# Patient Record
Sex: FEMALE | Race: WHITE | Employment: FULL TIME | URBAN - METROPOLITAN AREA
[De-identification: names, ages, dates, MRNs, and addresses within clinical notes are randomized per-mention and may not be internally consistent; named-entity substitution may affect disease eponyms.]

---

## 2017-10-04 ENCOUNTER — HOSPITAL ENCOUNTER (OUTPATIENT)
Dept: MAMMOGRAPHY | Facility: CLINIC | Age: 52
Discharge: HOME | End: 2017-10-04
Attending: FAMILY MEDICINE | Admitting: FAMILY MEDICINE
Payer: COMMERCIAL

## 2017-10-04 DIAGNOSIS — Z12.31 VISIT FOR SCREENING MAMMOGRAM: ICD-10-CM

## 2017-10-04 PROCEDURE — G0202 SCR MAMMO BI INCL CAD: HCPCS

## 2018-10-17 ENCOUNTER — TELEPHONE (OUTPATIENT)
Dept: FAMILY MEDICINE | Facility: CLINIC | Age: 53
End: 2018-10-17

## 2018-10-17 ENCOUNTER — HOSPITAL ENCOUNTER (OUTPATIENT)
Dept: MAMMOGRAPHY | Facility: CLINIC | Age: 53
Discharge: HOME | End: 2018-10-17
Attending: FAMILY MEDICINE | Admitting: FAMILY MEDICINE
Payer: COMMERCIAL

## 2018-10-17 DIAGNOSIS — Z12.31 VISIT FOR SCREENING MAMMOGRAM: ICD-10-CM

## 2018-10-17 PROCEDURE — 77067 SCR MAMMO BI INCL CAD: CPT

## 2018-10-17 NOTE — TELEPHONE ENCOUNTER
Patient can be seen to discuss cyst and mammogram results on 10/24 at 4:00pm. Please contact patient to advise and confirm. Appointment made to hold time.  Lily Cheney, CMA

## 2018-10-17 NOTE — TELEPHONE ENCOUNTER
Reason for Call:  Same Day Appointment, Requested Provider:  Kari Moya M.D.    PCP: Kari Moya    Reason for visit: discuss mammogram    Duration of symptoms: 3 yrs     Have you been treated for this in the past? Yes    Additional comments: cyst under right nipple, was going with cycle, cycle has changed.  Use to go from small to large now stays large     Can we leave a detailed message on this number? YES    Phone number patient can be reached at: Home number on file [phone] (home)    Best Time: anytime     Call taken on 10/17/2018 at 9:51 AM by Paty Clifton

## 2018-10-24 ENCOUNTER — OFFICE VISIT (OUTPATIENT)
Dept: FAMILY MEDICINE | Facility: CLINIC | Age: 53
End: 2018-10-24
Payer: COMMERCIAL

## 2018-10-24 VITALS
SYSTOLIC BLOOD PRESSURE: 132 MMHG | HEART RATE: 106 BPM | BODY MASS INDEX: 30.15 KG/M2 | OXYGEN SATURATION: 96 % | TEMPERATURE: 97.9 F | DIASTOLIC BLOOD PRESSURE: 80 MMHG | HEIGHT: 64 IN | WEIGHT: 176.6 LBS

## 2018-10-24 DIAGNOSIS — N63.0 LUMP OR MASS IN BREAST: Primary | ICD-10-CM

## 2018-10-24 DIAGNOSIS — N95.1 PERIMENOPAUSAL: ICD-10-CM

## 2018-10-24 PROCEDURE — 99213 OFFICE O/P EST LOW 20 MIN: CPT | Performed by: FAMILY MEDICINE

## 2018-10-24 RX ORDER — NAPROXEN SODIUM 220 MG
220 TABLET ORAL 2 TIMES DAILY WITH MEALS
COMMUNITY
End: 2020-10-07

## 2018-10-24 ASSESSMENT — PAIN SCALES - GENERAL: PAINLEVEL: NO PAIN (0)

## 2018-10-24 NOTE — MR AVS SNAPSHOT
"              After Visit Summary   10/24/2018    Kristal Mehta    MRN: [id number]           Patient Information     Date Of Birth          1965        Visit Information        Provider Department      10/24/2018 4:00 PM Kari Moya MD [facility]         Follow-ups after your visit        Who to contact     If you have questions or need follow up information about today's clinic visit or your schedule please contact [facility] directly at [phone].  Normal or non-critical lab and imaging results will be communicated to you by MyChart, letter or phone within 4 business days after the clinic has received the results. If you do not hear from us within 7 days, please contact the clinic through MyChart or phone. If you have a critical or abnormal lab result, we will notify you by phone as soon as possible.  Submit refill requests through [email or web address] or call your pharmacy and they will forward the refill request to us. Please allow 3 business days for your refill to be completed.          Additional Information About Your Visit        Care EveryWhere ID     This is your Care EveryWhere ID. This could be used by other organizations to access your [facility] medical records  [id number]        Your Vitals Were     Pulse Temperature Height Last Period Pulse Oximetry Breastfeeding?    106 97.9  F (36.6  C) (Temporal) 5' 4\" (1.626 m) 10/18/2018 (Exact Date) 96% No    BMI (Body Mass Index)                   30.31 kg/m2            Blood Pressure from Last 3 Encounters:   10/24/18 132/80   12/14/16 122/84   11/09/16 102/70    Weight from Last 3 Encounters:   10/24/18 176 lb 9.6 oz (80.1 kg)   12/14/16 176 lb 8 oz (80.1 kg)   11/09/16 170 lb 12 oz (77.5 kg)              Today, you had the following     No orders found for display       Primary Care Provider Office Phone # Fax #    Kari Moya [phone] [address] [place] " [address]        Equal Access to Services     [facility]: Hadii aad ku hadsiomnkristofer Renettaali, waloreleida luprashanthmishaha, qaunrulyta sharanharmanlevi herndon. So [facility] [phone].    ATENCIÓN: Si habla español, tiene a pedraza disposición servicios gratuitos de asistencia lingüística. Llame al [phone].    We comply with applicable federal civil rights laws and [place] laws. We do not discriminate on the basis of race, color, national origin, age, disability, sex, sexual orientation, or gender identity.            Thank you!     Thank you for choosing [facility]  for your care. Our goal is always to provide you with excellent care. Hearing back from our patients is one way we can continue to improve our services. Please take a few minutes to complete the written survey that you may receive in the mail after your visit with us. Thank you!             Your Updated Medication List - Protect others around you: Learn how to safely use, store and throw away your medicines at www.disposemymeds.org.          This list is accurate as of 10/24/18  5:22 PM.  Always use your most recent med list.                   Brand Name Dispense Instructions for use Diagnosis    ALEVE 220 MG tablet   Generic drug:  naproxen sodium      Take 220 mg by mouth 2 times daily (with meals)

## 2018-10-26 NOTE — PROGRESS NOTES
Visit Date:   10/24/2018      DATE OF VISIT:  10/24/2018.      SUBJECTIVE:  Kristal comes in today to follow up on a recent mammogram and ultrasound and to discuss breast cysts.  She has had some cysts noted on the past screening exams and has had followup ultrasounds and just is not sure where she stands on followup.  She is going through some menopausal changes as well as her periods.  See below for those notes.  She had a recent mammogram on 10/17/2018 that was normal.  This is a 3D mammogram and showed no abnormalities.  Prior to that, her last screening mammogram was also done as a 3D mammogram in 10/2017 and was also normal.  No suspicious abnormalities.  Prior to that, her recent followup was in 11/2016.  At that time it was an ultrasound on the right breast because of prior cysts.  She had a tiny residual cyst in the area of a prior biopsy.  She thinks she can still feel density on the inferior aspect of the right breast.  She does note that there is menstrual variation with this.  In the past it has come and gone with her periods, but now she thinks it seems to get bigger overall than it did in the past and it stays longer after her periods resolve.   Also her periods are changing now with perimenopause.  Her first period of 2018 was on 03/19, and she just had some very dark black blood.  Then again on 03/30, she had a very heavy episode where she went through 4 tampons in a day and then just a little bit of brown discharge and it ended suddenly.  Then she got another period a month later on 04/28 that lasted until 05/07 where it was light bleeding only on one side of the tampon.  When I asked her to describe this, she just said that the tampon was very lightly stained just on one side.  She thinks that relates to which side she is ovulating on.  She then had another menstrual cycle from 06/05 through 06/18.  It was very light but lasted longer than usual, and she had 2 nights of heavier bleeding during that  period.  Then she had nothing until 09/18, and it lasted until 09/25.  That was mostly spotting with wiping on the last 3 days.  She does have a sister with breast cancer at the age of 54.      Please see Epic for her past medical history which was briefly reviewed today.      OBJECTIVE:   VITAL SIGNS:  Noted.   GENERAL:  The patient is alert, oriented and in no acute distress.     BREASTS:  Visual inspection of the breasts reveals no visible abnormalities.  No asymmetry.  No skin changes or dimpling.  I examined her in the upright position with her hands at her sides, overhead and on her hips.  Lying down exam with palpation revealed a normal left breast.  On the right breast, there is a very slight density at the 8 o'clock position which I think is just fatty tissue and probably normal.  No discrete mass.  She feels the density at the 6 o'clock position on the right breast, and I do not feel any mass there.  She showed this to me right at the edge of the areola, but I did not appreciate a mass there.  Feels like normal fatty tissue.  No nipple discharge.  No axillary adenopathy.      ASSESSMENT:   1.  Followup breast cyst.   2.  Perimenopause.      PLAN:  I reassured her that her breast exam is normal today, and her last 2 mammograms were normal as well.  We will continue to follow these.  We discussed how breast changes can occur with menstrual changes and that she is going through perimenopause at this time.  We discussed perimenopause at length and these fluctuations in her period could last for a few more years, or they could stop at any time now.  I explained that if she has any bleeding after a year absence of bleeding, then she should have this followed up, but until then, there is a wide variety of normal bleeding period changes.  Will follow up with any concerns.  I also explained that bleeding on one side of the tampon is not really related to which side she ovulates on.  That is just an indicator of  light blood flow, not heavy enough to saturate the tampon.  I explained the anatomy and how the uterus comes together in the midline and blood comes out the center regardless of which side she ovulates on.  I offered her a flu vaccine today, but she declines.  We will continue to follow up with routine preventive care including mammograms yearly and other followup as needed.         URBANO GARCIA MD             D: 10/26/2018   T: 10/26/2018   MT: TIMA      Name:     KENNEY KIMBLE   MRN:      [id number]        Account:      [id number]   :      1965           Visit Date:   10/24/2018      Document: [id number]

## 2019-02-22 ENCOUNTER — TELEPHONE (OUTPATIENT)
Dept: FAMILY MEDICINE | Facility: CLINIC | Age: 54
End: 2019-02-22

## 2019-02-22 NOTE — TELEPHONE ENCOUNTER
Panel Management Review      Patient has the following on her problem list: None      Composite cancer screening  Chart review shows that this patient is due/due soon for the following Colonoscopy  Summary:    Patient is due/failing the following:   COLONOSCOPY and PHYSICAL    Action needed:   Patient needs office visit for physical. and Patient needs referral/order: colonoscopy    Type of outreach:    unable to reach pt at home number, no voicemail box set up. Please attempt to contact later.    Questions for provider review:    None                                                                                                                                    Lily Cheney CMA      Chart routed to Care Team .

## 2019-02-22 NOTE — LETTER
[address] [id number] [phone]        February 25, 2019    Kristal Mehta  [address]   [place] [address]        Dear Kristal,    We have been unable to reach you by phone. Please contact our office to update your contact information.    Our records indicate you are due for a colon cancer screening.    -Colon Cancer Screening- Recommended every 5-10 years, depending on your history, in order to prevent and detect colon cancer at its earliest stages.  Problem lesions can be removed before they ever become cancer.  This test is not only looking for cancer, but also getting rid of precancerious lesions.  You are usually given some sedation which makes the test very comfortable for most people.      If you do not wish to do a colonoscopy or cannot afford to do one, at this time, there is another option. It is called a FIT test or Fecal Immunochemical Occult Blood Test (take home stool sample kit).  It does not replace the colonoscopy for colorectal cancer screening, but it can detect hidden bleeding in the lower colon.  It does need to be repeated every year and if a positive result is obtained, you would be referred for a colonoscopy. The FIT test is really easy to do and does not require any  diet or medication restrictions and involves only one collection sample.      If you have completed either one of these tests or had a flexible sigmoidoscopy in the past five years at another facility, please have the records sent to our clinic so that we can best coordinate your care.  Please call us ([phone]) if you have questions or would like arrange either to do a colonoscopy or obtain the necessary test kit for the Fecal Occult Test.      Sincerely,        Kari Moya M.D.

## 2019-02-25 NOTE — TELEPHONE ENCOUNTER
Attempted to contact patient at home number with no answer and no voicemail box set up to leave a message. Letter mailed to patient.  Lily Cheney CMA

## 2019-03-11 NOTE — TELEPHONE ENCOUNTER
Patient calls to report she received letter.  She will have a her physical in October, and will schedule colonoscopy after that for the end of the year.

## 2019-03-20 ENCOUNTER — TELEPHONE (OUTPATIENT)
Dept: FAMILY MEDICINE | Facility: CLINIC | Age: 54
End: 2019-03-20

## 2019-03-20 NOTE — TELEPHONE ENCOUNTER
Reason for Call:  Same Day Appointment, Requested Provider:  Kari Moya M.D.    PCP: Kari Moya    Reason for visit: she was putting on deoderant today and noticed three bumps in her left armpit.  Would like to be seen as soon as possible for this.    Duration of symptoms:     Have you been treated for this in the past?     Additional comments:     Can we leave a detailed message on this number? YES    Phone number patient can be reached at: Home number on file [phone] (home)    Best Time: any    Call taken on 3/20/2019 at 3:28 PM by Camacho Russell;

## 2019-03-21 NOTE — TELEPHONE ENCOUNTER
Patient can be worked in 3/22 at 8:00am to check lumps in armpit. Appointment made to hold time.   Patient advised and states she can make this time work.  Lily Cheney, CMA

## 2019-03-22 ENCOUNTER — OFFICE VISIT (OUTPATIENT)
Dept: FAMILY MEDICINE | Facility: CLINIC | Age: 54
End: 2019-03-22
Payer: COMMERCIAL

## 2019-03-22 ENCOUNTER — TELEPHONE (OUTPATIENT)
Dept: FAMILY MEDICINE | Facility: CLINIC | Age: 54
End: 2019-03-22

## 2019-03-22 VITALS
RESPIRATION RATE: 12 BRPM | TEMPERATURE: 97.6 F | OXYGEN SATURATION: 97 % | WEIGHT: 172.4 LBS | SYSTOLIC BLOOD PRESSURE: 118 MMHG | HEART RATE: 100 BPM | DIASTOLIC BLOOD PRESSURE: 66 MMHG | BODY MASS INDEX: 29.59 KG/M2

## 2019-03-22 DIAGNOSIS — R73.01 ELEVATED FASTING GLUCOSE: ICD-10-CM

## 2019-03-22 DIAGNOSIS — R22.32 MASS OF LEFT AXILLA: Primary | ICD-10-CM

## 2019-03-22 DIAGNOSIS — E78.1 HYPERTRIGLYCERIDEMIA: ICD-10-CM

## 2019-03-22 DIAGNOSIS — Z13.6 CARDIOVASCULAR SCREENING; LDL GOAL LESS THAN 160: ICD-10-CM

## 2019-03-22 LAB
BASOPHILS # BLD AUTO: 0.1 10E9/L (ref 0–0.2)
BASOPHILS NFR BLD AUTO: 0.9 %
CHOLEST SERPL-MCNC: 239 MG/DL
DIFFERENTIAL METHOD BLD: ABNORMAL
EOSINOPHIL NFR BLD AUTO: 1.1 %
ERYTHROCYTE [DISTWIDTH] IN BLOOD BY AUTOMATED COUNT: 11.9 % (ref 10–15)
GLUCOSE SERPL-MCNC: 103 MG/DL (ref 70–99)
HBA1C MFR BLD: 5.6 % (ref 0–5.6)
HCT VFR BLD AUTO: 48.7 % (ref 35–47)
HDLC SERPL-MCNC: 41 MG/DL
HGB BLD-MCNC: 16.2 G/DL (ref 11.7–15.7)
IMM GRANULOCYTES # BLD: 0 10E9/L (ref 0–0.4)
IMM GRANULOCYTES NFR BLD: 0.3 %
LDLC SERPL CALC-MCNC: 138 MG/DL
LYMPHOCYTES # BLD AUTO: 1.7 10E9/L (ref 0.8–5.3)
LYMPHOCYTES NFR BLD AUTO: 25.9 %
MCH RBC QN AUTO: 31.3 PG (ref 26.5–33)
MCHC RBC AUTO-ENTMCNC: 33.3 G/DL (ref 31.5–36.5)
MCV RBC AUTO: 94 FL (ref 78–100)
MONOCYTES # BLD AUTO: 0.4 10E9/L (ref 0–1.3)
MONOCYTES NFR BLD AUTO: 6.5 %
NEUTROPHILS # BLD AUTO: 4.2 10E9/L (ref 1.6–8.3)
NEUTROPHILS NFR BLD AUTO: 65.3 %
NONHDLC SERPL-MCNC: 198 MG/DL
NRBC # BLD AUTO: 0 10*3/UL
NRBC BLD AUTO-RTO: 0 /100
PLATELET # BLD AUTO: 321 10E9/L (ref 150–450)
RBC # BLD AUTO: 5.18 10E12/L (ref 3.8–5.2)
TRIGL SERPL-MCNC: 298 MG/DL
WBC # BLD AUTO: 6.5 10E9/L (ref 4–11)

## 2019-03-22 PROCEDURE — 85025 COMPLETE CBC W/AUTO DIFF WBC: CPT | Performed by: FAMILY MEDICINE

## 2019-03-22 PROCEDURE — 80061 LIPID PANEL: CPT | Performed by: FAMILY MEDICINE

## 2019-03-22 PROCEDURE — 83036 HEMOGLOBIN GLYCOSYLATED A1C: CPT | Performed by: FAMILY MEDICINE

## 2019-03-22 PROCEDURE — 99214 OFFICE O/P EST MOD 30 MIN: CPT | Performed by: FAMILY MEDICINE

## 2019-03-22 PROCEDURE — 82947 ASSAY GLUCOSE BLOOD QUANT: CPT | Performed by: FAMILY MEDICINE

## 2019-03-22 PROCEDURE — 36415 COLL VENOUS BLD VENIPUNCTURE: CPT | Performed by: FAMILY MEDICINE

## 2019-03-22 ASSESSMENT — PAIN SCALES - GENERAL: PAINLEVEL: NO PAIN (0)

## 2019-03-22 NOTE — RESULT ENCOUNTER NOTE
Notify Nettie that her cholesterol is a little elevated again.  Her triglycerides are more elevated and her sugar is elevated this year as well.  I added a diabetes test and will notify her when that is done.  The good news is that her blood count is completely normal.  I have no concern at this time for cancer or infection in those lumps in the armpit.  Ok to send copy of labs.   Kari Moya MD

## 2019-03-22 NOTE — TELEPHONE ENCOUNTER
Attempted to contact patient to question what day and time would work for completing her US. No answer and no mail box set up to leave a voicemail message. Therese Velasco LPN

## 2019-03-22 NOTE — PROGRESS NOTES
S:  Kristal Mehta is a 53 year old female who complains of lumps in the armpit.  Message from 2 days ago states that she was putting on deodorant and noticed three bumps in her left armpit.      She thinks they might be a little smaller since then.  She is worried about developing cancer there.  She has had a breast lump biopsied in 2016 but it was on the right side.  It was benign.  Her recent mammogram in October was normal.  Her mammogram the year prior was normal.      Also she is fasting today and wondering if she can get her cholesterol drawn.  She has had mildly elevated cholesterol in the past.  She is otherwise well.    7 pt ROS is otherwise negative except as noted in HPI.      O:  /66   Pulse 100   Temp 97.6  F (36.4  C) (Temporal)   Resp 12   Wt 78.2 kg (172 lb 6.4 oz)   LMP 01/29/2019 (Exact Date)   SpO2 97%   Breastfeeding? No   BMI 29.59 kg/m     Neck:  Supple without lymphadenopathy, JVD or masses.   Breasts: Visual inspection of the breasts is normal without skin changes.  Palpation reveals no obvious masses or nipple discharge.  She has a small subcentimeter density just below the right nipple that feels fatty to me.  She states this lump is always been there.  It feels normal.  No right axillary masses.  In the left axilla when she extends her arm all the way up I can visualize 3 tiny sub-cutaneous lumps arranged in a triangle.  They are approximately 4-5 mm each.  No overlying skin changes.  No definite pimples or breaks in the skin.  Palpation of them is very subtle.  They feel fatty.  Not firm or mobile.  No surrounding induration or erythema.    Orders Placed This Encounter   Procedures     US Axillary Left     Glucose     Lipid panel reflex to direct LDL Fasting     CBC with platelets and differential     Hemoglobin A1c        A:      ICD-10-CM    1. Mass of left axilla R22.32 CBC with platelets and differential     US Axillary Left   2. CARDIOVASCULAR SCREENING; LDL GOAL LESS  THAN 160 Z13.6    3. Hypertriglyceridemia E78.1 Glucose     Lipid panel reflex to direct LDL Fasting   4. Elevated fasting glucose R73.01 Hemoglobin A1c       P: Overall my concern for these lumps being anything abnormal is small.  However because of her concern I am going to set her up with an ultrasound of the axilla and will do a CBC today to check for elevated white blood count.  This could indicate blood cell malignancy such as lymphoma or infection.  Also because she is fasting we will do screening labs including a lipid panel and a glucose.    Will notify her with results and she will monitor for changes in her lumps.  Encouraged ongoing monthly self breast exams.    Kari Moya MD

## 2019-03-25 ENCOUNTER — TELEPHONE (OUTPATIENT)
Dept: FAMILY MEDICINE | Facility: CLINIC | Age: 54
End: 2019-03-25

## 2019-03-25 ENCOUNTER — HOSPITAL ENCOUNTER (OUTPATIENT)
Dept: ULTRASOUND IMAGING | Facility: CLINIC | Age: 54
Discharge: HOME | End: 2019-03-25
Attending: FAMILY MEDICINE | Admitting: FAMILY MEDICINE
Payer: COMMERCIAL

## 2019-03-25 DIAGNOSIS — R22.32 MASS OF LEFT AXILLA: ICD-10-CM

## 2019-03-25 PROCEDURE — 76882 US LMTD JT/FCL EVL NVASC XTR: CPT | Mod: LT

## 2019-03-25 NOTE — TELEPHONE ENCOUNTER
Reason for Call:  Request for results:    Name of test or procedure: labs    Date of test of procedure: 03/22/19    Location of the test or procedure: [facility] to leave the result message on voice mail or with a family member? YES    Phone number Patient can be reached at:  [phone]    Additional comments: Kristal would like you to call her before 2 pm today with her lab results    Call taken on 3/25/2019 at 8:22 AM by Awa Barajas

## 2019-03-25 NOTE — TELEPHONE ENCOUNTER
Patient called back, relayed message above. Transferred to radiology to schedule appt.        Awa Etienne ~ Patient Representative  [address] [address]  [email or web address]  [email or web address]  Office:  [phone]  Fax:  [phone]

## 2019-03-26 NOTE — TELEPHONE ENCOUNTER
Per Kari Moya MD patient's A1c lab is upper end of the normal limit. This means patient is becoming pre-diabetic but not diabetic just yet. She recommends patient continue to work on weight loss and following a low carb diet. Patient should also work on exercise.   Called patient at home number with no answer and no voicemail set up. Please contact patient later to advise on MD notes.  Lily Cheney CMA

## 2019-03-26 NOTE — TELEPHONE ENCOUNTER
Patient is calling back and information was given. She is asking for a list of things to avoid and what to change. Please mail to her home address.  Thank you,  Leslie Lehman   for [facility]

## 2019-03-27 ENCOUNTER — TELEPHONE (OUTPATIENT)
Dept: FAMILY MEDICINE | Facility: CLINIC | Age: 54
End: 2019-03-27

## 2019-03-27 NOTE — TELEPHONE ENCOUNTER
Attempted to call pt again, no answer unable to leave msg. Will try again later. Edilma Zurita, CMA

## 2019-03-27 NOTE — RESULT ENCOUNTER NOTE
Notify Kristal that her ultrasound shows nothing abnormal in the underarm. I think these bumps are just skin irritation, and as long as they are not getting bigger we can ignore them.  Kari Moya MD

## 2019-03-27 NOTE — TELEPHONE ENCOUNTER
----- Message from Kari Moya MD sent at 3/27/2019  9:39 AM CDT -----  Notify Kristal that her ultrasound shows nothing abnormal in the underarm. I think these bumps are just skin irritation, and as long as they are not getting bigger we can ignore them.  Kari Moya MD

## 2019-03-27 NOTE — TELEPHONE ENCOUNTER
Called patient to inform of ultrasound results and MD recommendation. Patient did not answer and voicemail box is full so no message can be left. Please attempt to contact her again later.  Lily Cheney CMA

## 2019-05-29 ENCOUNTER — TELEPHONE (OUTPATIENT)
Dept: FAMILY MEDICINE | Facility: CLINIC | Age: 54
End: 2019-05-29

## 2019-05-29 DIAGNOSIS — R73.09 ELEVATED GLUCOSE: Primary | ICD-10-CM

## 2019-05-29 NOTE — TELEPHONE ENCOUNTER
Is patient able to complete A1C and glucose or should she wait a full 3 months? Last A1c was 3/22/19. Edilma Zurita, CMA

## 2019-05-29 NOTE — TELEPHONE ENCOUNTER
Reason for Call: Request for an order or referral:    Order or referral being requested: DM labs     Date needed: at your convenience    Has the patient been seen by the PCP for this problem? YES    Additional comments: Pt was told 2 months ago that she is pre-diabetic. She has lost 21 pounds since then and would like to have those labs rechecked.     Phone number Patient can be reached at:  Home number on file [phone] (home)    Best Time:  Any     Can we leave a detailed message on this number?  YES    Call taken on 5/29/2019 at 8:08 AM by Yissel Harding

## 2019-06-03 NOTE — TELEPHONE ENCOUNTER
Called patient and was unable to leave a message due to the inbox being full.  If patient calls back please relay this message per Dr. Moya.  Her a1c was barely elevated and she wouldn't be able to check that for 1 more year (3/2020) we can recheck her glucose if she would like.  Dr. Moya wanted to tell her good job with the loosing weight and to keep up the good work.    Nicole Calero, CMA

## 2019-06-04 NOTE — TELEPHONE ENCOUNTER
Tried calling pt and no answer and unable to leave a voice mail. Will try back later.  Skylar Rodriguez MA

## 2019-06-04 NOTE — TELEPHONE ENCOUNTER
Called pt and informed her of the below msg. She does want to recheck her glucose. I put an order in her chart and made her a lab appt for tomorrow.  Skylar Rodriguez MA

## 2019-06-05 DIAGNOSIS — R73.09 ELEVATED GLUCOSE: ICD-10-CM

## 2019-06-05 LAB — GLUCOSE SERPL-MCNC: 92 MG/DL (ref 70–99)

## 2019-06-05 PROCEDURE — 82947 ASSAY GLUCOSE BLOOD QUANT: CPT | Performed by: FAMILY MEDICINE

## 2019-06-05 PROCEDURE — 36415 COLL VENOUS BLD VENIPUNCTURE: CPT | Performed by: FAMILY MEDICINE

## 2019-06-07 ENCOUNTER — TELEPHONE (OUTPATIENT)
Dept: FAMILY MEDICINE | Facility: CLINIC | Age: 54
End: 2019-06-07

## 2019-06-07 NOTE — TELEPHONE ENCOUNTER
LM with  to have patient call the clinic with results below. Please relay message to patient when she calls. Nicole Mason CMA ([facility])

## 2019-06-07 NOTE — TELEPHONE ENCOUNTER
----- Message from Kari Moya MD sent at 6/7/2019  8:27 AM CDT -----  Notify Kristal that her blood sugar is normal.   Kari Moya MD

## 2019-10-16 ENCOUNTER — HOSPITAL ENCOUNTER (OUTPATIENT)
Dept: MAMMOGRAPHY | Facility: CLINIC | Age: 54
Discharge: HOME | End: 2019-10-16
Attending: FAMILY MEDICINE | Admitting: FAMILY MEDICINE
Payer: COMMERCIAL

## 2019-10-16 ENCOUNTER — OFFICE VISIT (OUTPATIENT)
Dept: FAMILY MEDICINE | Facility: CLINIC | Age: 54
End: 2019-10-16
Payer: COMMERCIAL

## 2019-10-16 ENCOUNTER — TELEPHONE (OUTPATIENT)
Dept: FAMILY MEDICINE | Facility: CLINIC | Age: 54
End: 2019-10-16

## 2019-10-16 VITALS
BODY MASS INDEX: 22.09 KG/M2 | DIASTOLIC BLOOD PRESSURE: 64 MMHG | WEIGHT: 132.6 LBS | SYSTOLIC BLOOD PRESSURE: 122 MMHG | OXYGEN SATURATION: 100 % | HEART RATE: 83 BPM | RESPIRATION RATE: 14 BRPM | HEIGHT: 65 IN | TEMPERATURE: 98 F

## 2019-10-16 DIAGNOSIS — E78.5 HYPERLIPIDEMIA LDL GOAL <160: ICD-10-CM

## 2019-10-16 DIAGNOSIS — E78.1 HYPERTRIGLYCERIDEMIA: ICD-10-CM

## 2019-10-16 DIAGNOSIS — Z12.11 SPECIAL SCREENING FOR MALIGNANT NEOPLASMS, COLON: ICD-10-CM

## 2019-10-16 DIAGNOSIS — R73.03 PREDIABETES: ICD-10-CM

## 2019-10-16 DIAGNOSIS — Z13.820 SCREENING FOR OSTEOPOROSIS: ICD-10-CM

## 2019-10-16 DIAGNOSIS — Z12.4 SCREENING FOR MALIGNANT NEOPLASM OF CERVIX: ICD-10-CM

## 2019-10-16 DIAGNOSIS — Z12.31 VISIT FOR SCREENING MAMMOGRAM: ICD-10-CM

## 2019-10-16 DIAGNOSIS — Z23 NEED FOR VACCINATION: ICD-10-CM

## 2019-10-16 DIAGNOSIS — Z00.00 ROUTINE GENERAL MEDICAL EXAMINATION AT A HEALTH CARE FACILITY: Primary | ICD-10-CM

## 2019-10-16 LAB
CHOLEST SERPL-MCNC: 214 MG/DL
GLUCOSE SERPL-MCNC: 85 MG/DL (ref 70–99)
HBA1C MFR BLD: 5.4 % (ref 0–5.6)
HDLC SERPL-MCNC: 51 MG/DL
LDLC SERPL CALC-MCNC: 135 MG/DL
NONHDLC SERPL-MCNC: 163 MG/DL
TRIGL SERPL-MCNC: 141 MG/DL

## 2019-10-16 PROCEDURE — 87624 HPV HI-RISK TYP POOLED RSLT: CPT | Performed by: FAMILY MEDICINE

## 2019-10-16 PROCEDURE — 36415 COLL VENOUS BLD VENIPUNCTURE: CPT | Performed by: FAMILY MEDICINE

## 2019-10-16 PROCEDURE — 90471 IMMUNIZATION ADMIN: CPT | Performed by: FAMILY MEDICINE

## 2019-10-16 PROCEDURE — G0145 SCR C/V CYTO,THINLAYER,RESCR: HCPCS | Performed by: FAMILY MEDICINE

## 2019-10-16 PROCEDURE — 82947 ASSAY GLUCOSE BLOOD QUANT: CPT | Performed by: FAMILY MEDICINE

## 2019-10-16 PROCEDURE — 90715 TDAP VACCINE 7 YRS/> IM: CPT | Performed by: FAMILY MEDICINE

## 2019-10-16 PROCEDURE — 80061 LIPID PANEL: CPT | Performed by: FAMILY MEDICINE

## 2019-10-16 PROCEDURE — 83036 HEMOGLOBIN GLYCOSYLATED A1C: CPT | Performed by: FAMILY MEDICINE

## 2019-10-16 PROCEDURE — 99396 PREV VISIT EST AGE 40-64: CPT | Mod: 25 | Performed by: FAMILY MEDICINE

## 2019-10-16 PROCEDURE — 77063 BREAST TOMOSYNTHESIS BI: CPT

## 2019-10-16 ASSESSMENT — MIFFLIN-ST. JEOR: SCORE: 1217.96

## 2019-10-16 NOTE — TELEPHONE ENCOUNTER
Date of colonoscopy/EGD: 11/13  Surgeon: Dr. Forrester  Prep:Miralax  Packet:Colonoscopy/EGD instructions mailed to patient's home address.   Date: 10/16/2019      Surgery Scheduler

## 2019-10-16 NOTE — LETTER
October 24, 2019    Kristal Mehta  [address] [place] [address]    Dear ,  This letter is regarding your recent Pap smear (cervical cancer screening) and Human Papillomavirus (HPV) test.  We are happy to inform you that your Pap smear result is normal. Cervical cancer is closely linked with certain types of HPV. Your results showed no evidence of high-risk HPV.  We recommend you have your next PAP smear and HPV test in 5 years.  You will still need to return to the clinic every year for an annual exam and other preventive tests.  If you have additional questions regarding this result, please call our registered nurse, Yissel at [phone].  Sincerely,    Kari Moya MD/floresita

## 2019-10-16 NOTE — TELEPHONE ENCOUNTER
Left message for patient to return call to schedule colonoscopy or EGD. If Celeste or Alize are unavailable, please transfer to the surgery center.

## 2019-10-16 NOTE — PATIENT INSTRUCTIONS
I will notify you with lab results from today.    I recommend you get a Bone Density Scan within the next year. Please schedule this at your convenience.     I ordered a Colonoscopy. They will call you to schedule this at your convenience.     You will be notified with your mammogram results.     Consider a low dose screening CT scan for lung cancer.  You are eligible for this due to your age and smoking history. Notify me if you'd like to have this done.     Preventive Health Recommendations  Female Ages 50 - 64    Yearly exam: See your health care provider every year in order to  o Review health changes.   o Discuss preventive care.    o Review your medicines if your doctor has prescribed any.      Get a Pap test every three years (unless you have an abnormal result and your provider advises testing more often).    If you get Pap tests with HPV test, you only need to test every 5 years, unless you have an abnormal result.     You do not need a Pap test if your uterus was removed (hysterectomy) and you have not had cancer.    You should be tested each year for STDs (sexually transmitted diseases) if you're at risk.     Have a mammogram every 1 to 2 years.    Have a colonoscopy at age 50, or have a yearly FIT test (stool test). These exams screen for colon cancer.      Have a cholesterol test every 5 years, or more often if advised.    Have a diabetes test (fasting glucose) every three years. If you are at risk for diabetes, you should have this test more often.     If you are at risk for osteoporosis (brittle bone disease), think about having a bone density scan (DEXA).    Shots: Get a flu shot each year. Get a tetanus shot every 10 years.    Nutrition:     Eat at least 5 servings of fruits and vegetables each day.    Eat whole-grain bread, whole-wheat pasta and brown rice instead of white grains and rice.    Get adequate Calcium and Vitamin D.     Lifestyle    Exercise at least 150 minutes a week (30 minutes a  day, 5 days a week). This will help you control your weight and prevent disease.    Limit alcohol to one drink per day.    No smoking.     Wear sunscreen to prevent skin cancer.     See your dentist every six months for an exam and cleaning.    See your eye doctor every 1 to 2 years.

## 2019-10-16 NOTE — PROGRESS NOTES
SUBJECTIVE:   CC: Kristal Mehta is an 53 year old woman who presents for preventive health visit.     Healthy Habits:     Getting at least 3 servings of Calcium per day:  Yes    Bi-annual eye exam:  Yes    Dental care twice a year:  NO    Sleep apnea or symptoms of sleep apnea:  None    Diet:  Diabetic    Frequency of exercise:  6-7 days/week    Duration of exercise:  45-60 minutes    Barriers to taking medications:  Not applicable    Medication side effects:  Not applicable    PHQ-2 Total Score: 0    Additional concerns today:  No      Hyperlipidemia Follow-Up    She has a history of Hyperlipidemia, currently not treated with any medication.       Are you having any of the following symptoms? (Select all that apply)  No complaints of shortness of breath, chest pain or pressure.  No increased sweating or nausea with activity.  No left-sided neck or arm pain.  No complaints of pain in calves when walking 1-2 blocks.    Are you regularly taking any medication or supplement to lower your cholesterol?   No    Are you having muscle aches or other side effects that you think could be caused by your cholesterol lowering medication?  N/A      CONCERNS: She doesn't have any concerns at this visit.     See ROS.       Today's PHQ-2 Score:   PHQ-2 ( 1999 Pfizer) 10/16/2019   Q1: Little interest or pleasure in doing things 0   Q2: Feeling down, depressed or hopeless 0   PHQ-2 Score 0       Abuse: Current or Past(Physical, Sexual or Emotional)- No  Do you feel safe in your environment? Yes    Social History     Tobacco Use     Smoking status: Current Every Day Smoker     Packs/day: 0.50     Years: 18.00     Pack years: 9.00     Smokeless tobacco: Never Used   Substance Use Topics     Alcohol use: Yes     Alcohol/week: 0.0 standard drinks     Comment: occasionally         Alcohol Use 11/9/2016   Prescreen: >3 drinks/day or >7 drinks/week? The patient does not drink >3 drinks per day nor >7 drinks per week.     Reviewed orders  with patient.  Reviewed health maintenance and updated orders accordingly - Yes    BP Readings from Last 3 Encounters:   10/16/19 122/64   03/22/19 118/66   10/24/18 132/80    Wt Readings from Last 3 Encounters:   10/16/19 60.1 kg (132 lb 9.6 oz)   03/22/19 78.2 kg (172 lb 6.4 oz)   10/24/18 80.1 kg (176 lb 9.6 oz)          Patient Active Problem List   Diagnosis     FAMILY HX CONDITION mo mult infarct dementia     Personal history of tobacco use, presenting hazards to health     Hyperlipidemia LDL goal <160     Uterine leiomyoma, unspecified location     Past Surgical History:   Procedure Laterality Date     HC EXC BENIGN SKIN LESION TRUNK/ARM/LEG >4 CM  10/01/08    excision lesion left thigh     HC EXCISION BREAST LESION, OPEN >=1  12/13/06    Left breast     HC REPAIR INTERMED, WOUND TRUNK/ARM/LEG 2.6-7.5 CM       OPEN REDUCTION INTERNAL FIXATION WRIST  12/6/2011    right wrist fracture repair       Social History     Tobacco Use     Smoking status: Current Every Day Smoker     Packs/day: 0.50     Years: 18.00     Pack years: 9.00     Smokeless tobacco: Never Used   Substance Use Topics     Alcohol use: Yes     Alcohol/week: 0.0 standard drinks     Comment: occasionally     Family History   Problem Relation Age of Onset     Allergies Mother         animal and trees     Genitourinary Problems Mother         bladder tumor non cancerous     Lipids Mother         cholesterol     Alzheimer Disease Maternal Grandfather      Other Cancer Sister      Alcohol/Drug No family hx of      Anesthesia Reaction No family hx of      Arthritis No family hx of      Blood Disease No family hx of      Cancer No family hx of      Circulatory No family hx of      Connective Tissue Disorder No family hx of      Depression No family hx of      Diabetes No family hx of      Genetic Disorder No family hx of      Gastrointestinal Disease No family hx of      Gynecology No family hx of      Heart Disease No family hx of      Hypertension No  family hx of      Neurologic Disorder No family hx of      Osteoporosis No family hx of      Psychotic Disorder No family hx of      Respiratory No family hx of      Cerebrovascular Disease No family hx of      Thyroid Disease No family hx of      Obesity No family hx of          Current Outpatient Medications   Medication Sig Dispense Refill     Flaxseed, Linseed, (FLAXSEED OIL PO)        Multiple Vitamins-Minerals (HAIR SKIN AND NAILS FORMULA PO)        naproxen sodium (ALEVE) 220 MG tablet Take 220 mg by mouth 2 times daily (with meals)       Allergies   Allergen Reactions     No Known Drug Allergies        Mammogram Screening: Patient over age 50, mutual decision to screen reflected in health maintenance.    Pertinent mammograms are reviewed under the imaging tab.  History of abnormal Pap smear:   YES - updated in Problem List and Health Maintenance accordingly    Last 3 Pap Results:   PAP (no units)   Date Value   11/09/2016 NIL   11/10/2010 ASC-US (A)   12/26/2006 NIL     PAP / HPV Latest Ref Rng & Units 11/9/2016 11/10/2010 12/26/2006   PAP - NIL ASC-US(A) NIL   HPV 16 DNA NEG Negative - -   HPV 18 DNA NEG Negative - -   OTHER HR HPV NEG Negative - -     Reviewed and updated as needed this visit by clinical staff  Tobacco  Allergies  Meds  Med Hx  Surg Hx  Fam Hx  Soc Hx        Reviewed and updated as needed this visit by Provider  Tobacco  Allergies  Meds  Problems  Med Hx  Surg Hx  Fam Hx        Past Medical History:   Diagnosis Date     ASCUS of cervix with negative high risk HPV 8/28/2011    11/10/10- ASCUS neg HPV. Plan-- repeat screening pap in one year (due 11/2011) 11/8/16 NIL pap/neg HR HPV. Plan:         Past Surgical History:   Procedure Laterality Date     HC EXC BENIGN SKIN LESION TRUNK/ARM/LEG >4 CM  10/01/08    excision lesion left thigh     HC EXCISION BREAST LESION, OPEN >=1  12/13/06    Left breast     HC REPAIR INTERMED, WOUND TRUNK/ARM/LEG 2.6-7.5 CM       OPEN REDUCTION  "INTERNAL FIXATION WRIST  12/6/2011    right wrist fracture repair       Review of Systems  CONSTITUTIONAL: Positive for intentional weight loss. She has lost 40 lbs since 3/22/2019, when she was diagnosed with Pre-Diabetes. She decreased her carb intake, fat intake, and controls her portion sizes. She also exercises using a \"Simple Fit\" board and exercising daily. NEGATIVE for fever, chills  INTEGUMENTARU/SKIN: NEGATIVE for worrisome rashes, moles or lesions  EYES: NEGATIVE for vision changes or irritation. She wears corrective lenses. She follows up with her eye doctor regularly.   ENT: NEGATIVE for ear, mouth and throat problems  RESP: NEGATIVE for significant cough or SOB  BREAST: NEGATIVE for masses, tenderness or discharge  CV: NEGATIVE for chest pain, palpitations or peripheral edema  GI: NEGATIVE for nausea, abdominal pain, heartburn, or change in bowel habits  : NEGATIVE for unusual urinary or vaginal symptoms. Last LMP was 1/29/2019. PMHx of uterine fibroids.   MUSCULOSKELETAL: NEGATIVE for significant arthralgias or myalgia  NEURO: NEGATIVE for weakness, dizziness or paresthesias  PSYCHIATRIC: NEGATIVE for changes in mood or affect     OBJECTIVE:   /64   Pulse 83   Temp 98  F (36.7  C) (Temporal)   Resp 14   Ht 1.66 m (5' 5.35\")   Wt 60.1 kg (132 lb 9.6 oz)   LMP 01/29/2019 (Exact Date)   SpO2 100%   BMI 21.83 kg/m    Physical Exam  GENERAL: healthy, alert and no distress  EYES: Eyes grossly normal to inspection, PERRL and conjunctivae and sclerae normal  HENT: ear canals normal. Slight clear fluid behind bilateral TMs. TMs are otherwise normal. nose and mouth without ulcers or lesions  NECK: no adenopathy, no asymmetry, masses, or scars and thyroid normal to palpation, no bruits.   RESP: lungs clear to auscultation - no rales, rhonchi or wheezes  BREAST: normal without masses, tenderness or nipple discharge and no palpable axillary masses or adenopathy  CV: regular rate and rhythm, normal " S1 S2, no S3 or S4, no murmur, click or rub, no peripheral edema and peripheral pulses strong  ABDOMEN: soft, nontender, no hepatosplenomegaly, no masses and bowel sounds normal  PELVIC: Vaginal exam reveals normal external and internal genitalia.  Cervix is closed, long and thick.  No lesions or abnormalities seen. PAP obtained without incident.  Bimanual exam reveals a nontender, nongravid uterus with no CMT.  No adnexal masses or tenderness.    MS: no gross musculoskeletal defects noted, no edema  SKIN: no suspicious lesions or rashes  NEURO: Normal strength and tone, mentation intact and speech normal  PSYCH: mentation appears normal, affect normal/bright    Diagnostic Test Results:  Orders Placed This Encounter   Procedures     DX Hip/Pelvis/Spine     Pap imaged thin layer screen with HPV - recommended age 30 - 65 years (select HPV order below)     HPV High Risk Types DNA Cervical     Lipid panel reflex to direct LDL Fasting     Glucose     Hemoglobin A1c     GASTROENTEROLOGY ADULT REF PROCEDURE ONLY None       ASSESSMENT/PLAN:       ICD-10-CM    1. Routine general medical examination at a health care facility Z00.00    2. Prediabetes R73.03 Glucose     Hemoglobin A1c   3. Hypertriglyceridemia E78.1 Lipid panel reflex to direct LDL Fasting   4. Special screening for malignant neoplasms, colon Z12.11 GASTROENTEROLOGY ADULT REF PROCEDURE ONLY None   5. Screening for malignant neoplasm of cervix Z12.4 Pap imaged thin layer screen with HPV - recommended age 30 - 65 years (select HPV order below)     HPV High Risk Types DNA Cervical   6. Screening for osteoporosis Z13.820 DX Hip/Pelvis/Spine   7. Need for vaccination Z23 TDAP VACCINE (ADACEL) [28101.002]   8. Hyperlipidemia LDL goal <160 E78.5      - Her last PAP was NIL on 11/9/2016. PAP collected today, will notify with results.   - Labs collected as above, will notify with results and discuss further evaluation/ treatment if needed at that time.   - No refills  "needed.   - Mammogram completed today, will notify with results.   - Colonoscopy ordered, she will schedule this at her convenience.   - Her bone density scan was normal in 7/2013. Bone Density Scan ordered, will notify with results.   - Discussed lung cancer CT for smokers. She will notify me if she'd like to have this done.   - TDAP given today. She declines the Influenza vaccination. Reviewed, vaccinations are otherwise UTD.   - Follow up for routine physical in 1 year, or sooner if needed.      COUNSELING:  Reviewed preventive health counseling, as reflected in patient instructions       Regular exercise       Healthy diet/nutrition       Vision screening       Hearing screening       Osteoporosis Prevention/Bone Health       Colon cancer screening       (Esha)menopause management       Lung CT for lung cancer screen     Estimated body mass index is 21.83 kg/m  as calculated from the following:    Height as of this encounter: 1.66 m (5' 5.35\").    Weight as of this encounter: 60.1 kg (132 lb 9.6 oz).  - Discussed maintaining her weight loss without losing more.        reports that she has been smoking. She has a 9.00 pack-year smoking history. She has never used smokeless tobacco.  Tobacco Cessation Action Plan: Information offered: Patient not interested at this time   - Encouraged smoking cessation.     Counseling Resources:  ATP IV Guidelines  Pooled Cohorts Equation Calculator  Breast Cancer Risk Calculator  FRAX Risk Assessment  ICSI Preventive Guidelines  Dietary Guidelines for Americans, 2010  USDA's MyPlate  ASA Prophylaxis  Lung CA Screening    This document serves as a record of services personally performed by Kari Moya MD. It was created on their behalf by Nicole Moncada, a trained medical scribe. The creation of this record is based on the provider's personal observations and the statements of the patient. This document has been checked and approved by the attending provider.    Kari " Neeta Moya MD  [facility]

## 2019-10-16 NOTE — NURSING NOTE
Prior to immunization administration, verified patients identity using patient s name and date of birth. Please see Immunization Activity for additional information.     Screening Questionnaire for Adult Immunization    Are you sick today?   No   Do you have allergies to medications, food, a vaccine component or latex?   No   Have you ever had a serious reaction after receiving a vaccination?   No   Do you have a long-term health problem with heart disease, lung disease, asthma, kidney disease, metabolic disease (e.g. diabetes), anemia, or other blood disorder?   No   Do you have cancer, leukemia, HIV/AIDS, or any other immune system problem?   No   In the past 3 months, have you taken medications that affect  your immune system, such as prednisone, other steroids, or anticancer drugs; drugs for the treatment of rheumatoid arthritis, Crohn s disease, or psoriasis; or have you had radiation treatments?   No   Have you had a seizure, or a brain or other nervous system problem?   No   During the past year, have you received a transfusion of blood or blood     products, or been given immune (gamma) globulin or antiviral drug?   No   For women: Are you pregnant or is there a chance you could become        pregnant during the next month?   No   Have you received any vaccinations in the past 4 weeks?   No     Immunization questionnaire answers were all negative.        Per orders of Dr. Moya, injection of Tdap given by Yuliya Wilkes MA. Patient instructed to remain in clinic for 15 minutes afterwards, and to report any adverse reaction to me immediately.       Screening performed by Yuliya Wilkes MA on 10/16/2019 at 12:25 PM.

## 2019-10-18 ENCOUNTER — TELEPHONE (OUTPATIENT)
Dept: FAMILY MEDICINE | Facility: CLINIC | Age: 54
End: 2019-10-18

## 2019-10-18 LAB
COPATH REPORT: NORMAL
PAP: NORMAL

## 2019-10-18 NOTE — RESULT ENCOUNTER NOTE
Notify her of her lab results. Things look much better. Her diabetes test and glucose are back to normal. Her cholesterol is much improved. Keep up the good work, and I'll recheck these in 1 year.   Send copy of all labs.   Kari Moya MD

## 2019-10-18 NOTE — TELEPHONE ENCOUNTER
----- Message from Kari Moya MD sent at 10/18/2019  2:44 AM CDT -----  Notify her of her lab results. Things look much better. Her diabetes test and glucose are back to normal. Her cholesterol is much improved. Keep up the good work, and I'll recheck these in 1 year.   Send copy of all labs.   Kari Moya MD

## 2019-10-18 NOTE — LETTER
October 21, 2019      Kristal Mehta  [address] [place] [address]        Dear ,    We are writing to inform you of your test results.    Things look much better. Your diabetes test and glucose are back to normal. Your cholesterol is much improved. Keep up the good work, and I'll recheck these in 1 year.       Resulted Orders   Lipid panel reflex to direct LDL Fasting   Result Value Ref Range    Cholesterol 214 (H) <200 mg/dL      Comment:      Desirable:       <200 mg/dl    Triglycerides 141 <150 mg/dL      Comment:      Fasting specimen    HDL Cholesterol 51 >49 mg/dL    LDL Cholesterol Calculated 135 (H) <100 mg/dL      Comment:      Above desirable:  100-129 mg/dl  Borderline High:  130-159 mg/dL  High:             160-189 mg/dL  Very high:       >189 mg/dl      Non HDL Cholesterol 163 (H) <130 mg/dL      Comment:      Above Desirable:  130-159 mg/dl  Borderline high:  160-189 mg/dl  High:             190-219 mg/dl  Very high:       >219 mg/dl     Glucose   Result Value Ref Range    Glucose 85 70 - 99 mg/dL      Comment:      Fasting specimen   Hemoglobin A1c   Result Value Ref Range    Hemoglobin A1C 5.4 0 - 5.6 %      Comment:      Normal <5.7% Prediabetes 5.7-6.4%  Diabetes 6.5% or higher - adopted from ADA   consensus guidelines.         If you have any questions or concerns, please call the clinic at the number listed above.       Sincerely,        Kari Moya MD/carmelita

## 2019-10-21 NOTE — TELEPHONE ENCOUNTER
Left message for patient to return call, see message below.   Dede Calle on 10/21/2019 at 9:53 AM

## 2019-10-22 LAB
FINAL DIAGNOSIS: NORMAL
HPV HR 12 DNA CVX QL NAA+PROBE: NEGATIVE
HPV16 DNA SPEC QL NAA+PROBE: NEGATIVE
HPV18 DNA SPEC QL NAA+PROBE: NEGATIVE
SPECIMEN DESCRIPTION: NORMAL
SPECIMEN SOURCE CVX/VAG CYTO: NORMAL

## 2019-10-24 PROBLEM — R73.03 PREDIABETES: Status: ACTIVE | Noted: 2019-10-24

## 2019-11-12 NOTE — H&P
[facility] History and Physical    Kristal Mehta MRN# [id number]   Age: 53 year old YOB: 1965     Date of Admission:  (Not on file)    Home clinic: [facility]  Primary care provider: Kari Moya          Impression and Plan:   Impression:   Special screening for malignant neoplasms, colon [Z12.11]  No prior colonoscopy      Plan:   Proceed to Colonoscopy as planned.  The procedure, risks(bleeding, perforation), benefits and alternatives were discussed and the patient agrees to proceed. Cleared for Anesthesia             Chief Complaint:   Special screening for malignant neoplasms, colon [Z12.11]    History is obtained from the patient          History of Present Illness:   This 53 year old female is being seen at this time for evaluation for colonoscopy.  No complaints or family hx.             Past Medical History:     Past Medical History:   Diagnosis Date     ASCUS of cervix with negative high risk HPV 8/28/2011    11/10/10- ASCUS neg HPV. Plan-- repeat screening pap in one year (due 11/2011) 11/8/16 NIL pap/neg HR HPV. Plan:               Past Surgical History:     Past Surgical History:   Procedure Laterality Date     HC EXC BENIGN SKIN LESION TRUNK/ARM/LEG >4 CM  10/01/08    excision lesion left thigh     HC EXCISION BREAST LESION, OPEN >=1  12/13/06    Left breast     HC REPAIR INTERMED, WOUND TRUNK/ARM/LEG 2.6-7.5 CM       OPEN REDUCTION INTERNAL FIXATION WRIST  12/6/2011    right wrist fracture repair            Social History:     Social History     Tobacco Use     Smoking status: Current Every Day Smoker     Packs/day: 0.50     Years: 18.00     Pack years: 9.00     Smokeless tobacco: Never Used   Substance Use Topics     Alcohol use: Yes     Alcohol/week: 0.0 standard drinks     Comment: occasionally            Family History:     Family History   Problem Relation Age of Onset     Allergies Mother         animal and trees     Genitourinary Problems  Mother         bladder tumor non cancerous     Lipids Mother         cholesterol     Alzheimer Disease Maternal Grandfather      Other Cancer Sister      Alcohol/Drug No family hx of      Anesthesia Reaction No family hx of      Arthritis No family hx of      Blood Disease No family hx of      Cancer No family hx of      Circulatory No family hx of      Connective Tissue Disorder No family hx of      Depression No family hx of      Diabetes No family hx of      Genetic Disorder No family hx of      Gastrointestinal Disease No family hx of      Gynecology No family hx of      Heart Disease No family hx of      Hypertension No family hx of      Neurologic Disorder No family hx of      Osteoporosis No family hx of      Psychotic Disorder No family hx of      Respiratory No family hx of      Cerebrovascular Disease No family hx of      Thyroid Disease No family hx of      Obesity No family hx of             Immunizations:     VACCINE/DOSE   Diptheria   DPT   DTAP   HBIG   Hepatitis A   Hepatitis B   HIB   Influenza   Measles   Meningococcal   MMR   Mumps   Pneumococcal   Polio   Rubella   Small Pox   TDAP   Varicella   Zoster            Allergies:     Allergies   Allergen Reactions     No Known Drug Allergies             Medications:     No current facility-administered medications for this encounter.      Current Outpatient Medications   Medication Sig     Flaxseed, Linseed, (FLAXSEED OIL PO)      Multiple Vitamins-Minerals (HAIR SKIN AND NAILS FORMULA PO)      naproxen sodium (ALEVE) 220 MG tablet Take 220 mg by mouth 2 times daily (with meals)             Review of Systems:   The review of systems was positive for the following findings.  None.  The remainder of the review of systems was unremarkable.          Physical Exam:   All vitals have been reviewed  not currently breastfeeding.  No intake or output data in the 24 hours ending 11/12/19 0832  SHEENT examination revealed NC/aT, EOMI.  Examination of the chest  revealed CTA.  Examination of the heart revealed S1, S2, (-)m/r/g.  Examination of the abdomen revealed soft, non tender, non distended.  The neuromuscular examination was WNL          Data:   All laboratory data reviewed  No results found for any visits on 11/13/19.       Jeremías Forrester MD, FACS

## 2019-11-13 ENCOUNTER — HOSPITAL ENCOUNTER (OUTPATIENT)
Facility: CLINIC | Age: 54
Discharge: HOME | End: 2019-11-13
Attending: SPECIALIST | Admitting: SPECIALIST
Payer: COMMERCIAL

## 2019-11-13 ENCOUNTER — ANESTHESIA (OUTPATIENT)
Dept: GASTROENTEROLOGY | Facility: CLINIC | Age: 54
End: 2019-11-13
Payer: COMMERCIAL

## 2019-11-13 ENCOUNTER — ANESTHESIA EVENT (OUTPATIENT)
Dept: GASTROENTEROLOGY | Facility: CLINIC | Age: 54
End: 2019-11-13
Payer: COMMERCIAL

## 2019-11-13 VITALS
DIASTOLIC BLOOD PRESSURE: 88 MMHG | SYSTOLIC BLOOD PRESSURE: 120 MMHG | HEART RATE: 49 BPM | OXYGEN SATURATION: 100 % | TEMPERATURE: 98.1 F | RESPIRATION RATE: 16 BRPM

## 2019-11-13 LAB — COLONOSCOPY: NORMAL

## 2019-11-13 PROCEDURE — 45385 COLONOSCOPY W/LESION REMOVAL: CPT | Mod: PT | Performed by: SPECIALIST

## 2019-11-13 PROCEDURE — 25800030 ZZH RX IP 258 OP 636: Performed by: SPECIALIST

## 2019-11-13 PROCEDURE — 25000125 ZZHC RX 250: Performed by: SPECIALIST

## 2019-11-13 PROCEDURE — 25000128 H RX IP 250 OP 636: Performed by: NURSE ANESTHETIST, CERTIFIED REGISTERED

## 2019-11-13 PROCEDURE — 25000132 ZZH RX MED GY IP 250 OP 250 PS 637: Performed by: SPECIALIST

## 2019-11-13 PROCEDURE — 25000125 ZZHC RX 250: Performed by: NURSE ANESTHETIST, CERTIFIED REGISTERED

## 2019-11-13 PROCEDURE — 37000008 ZZH ANESTHESIA TECHNICAL FEE, 1ST 30 MIN: Performed by: SPECIALIST

## 2019-11-13 PROCEDURE — 37000009 ZZH ANESTHESIA TECHNICAL FEE, EACH ADDTL 15 MIN: Performed by: SPECIALIST

## 2019-11-13 PROCEDURE — 45380 COLONOSCOPY AND BIOPSY: CPT | Performed by: SPECIALIST

## 2019-11-13 PROCEDURE — 88305 TISSUE EXAM BY PATHOLOGIST: CPT | Performed by: SPECIALIST

## 2019-11-13 PROCEDURE — 88305 TISSUE EXAM BY PATHOLOGIST: CPT | Mod: 26 | Performed by: SPECIALIST

## 2019-11-13 RX ORDER — GLYCOPYRROLATE 0.2 MG/ML
INJECTION, SOLUTION INTRAMUSCULAR; INTRAVENOUS PRN
Status: DISCONTINUED | OUTPATIENT
Start: 2019-11-13 | End: 2019-11-13

## 2019-11-13 RX ORDER — SODIUM CHLORIDE, SODIUM LACTATE, POTASSIUM CHLORIDE, CALCIUM CHLORIDE 600; 310; 30; 20 MG/100ML; MG/100ML; MG/100ML; MG/100ML
INJECTION, SOLUTION INTRAVENOUS CONTINUOUS
Status: DISCONTINUED | OUTPATIENT
Start: 2019-11-13 | End: 2019-11-13 | Stop reason: HOSPADM

## 2019-11-13 RX ORDER — ONDANSETRON 2 MG/ML
4 INJECTION INTRAMUSCULAR; INTRAVENOUS EVERY 30 MIN PRN
Status: DISCONTINUED | OUTPATIENT
Start: 2019-11-13 | End: 2019-11-13 | Stop reason: HOSPADM

## 2019-11-13 RX ORDER — LIDOCAINE 40 MG/G
CREAM TOPICAL
Status: DISCONTINUED | OUTPATIENT
Start: 2019-11-13 | End: 2019-11-13 | Stop reason: HOSPADM

## 2019-11-13 RX ORDER — NALOXONE HYDROCHLORIDE 0.4 MG/ML
.1-.4 INJECTION, SOLUTION INTRAMUSCULAR; INTRAVENOUS; SUBCUTANEOUS
Status: DISCONTINUED | OUTPATIENT
Start: 2019-11-13 | End: 2019-11-13 | Stop reason: HOSPADM

## 2019-11-13 RX ORDER — ALBUTEROL SULFATE 0.83 MG/ML
2.5 SOLUTION RESPIRATORY (INHALATION) EVERY 4 HOURS PRN
Status: DISCONTINUED | OUTPATIENT
Start: 2019-11-13 | End: 2019-11-13 | Stop reason: HOSPADM

## 2019-11-13 RX ORDER — PROPOFOL 10 MG/ML
INJECTION, EMULSION INTRAVENOUS PRN
Status: DISCONTINUED | OUTPATIENT
Start: 2019-11-13 | End: 2019-11-13

## 2019-11-13 RX ORDER — SODIUM CHLORIDE 9 MG/ML
INJECTION, SOLUTION INTRAVENOUS CONTINUOUS
Status: DISCONTINUED | OUTPATIENT
Start: 2019-11-13 | End: 2019-11-13 | Stop reason: HOSPADM

## 2019-11-13 RX ORDER — LIDOCAINE HYDROCHLORIDE 20 MG/ML
INJECTION, SOLUTION INFILTRATION; PERINEURAL PRN
Status: DISCONTINUED | OUTPATIENT
Start: 2019-11-13 | End: 2019-11-13

## 2019-11-13 RX ORDER — PROPOFOL 10 MG/ML
INJECTION, EMULSION INTRAVENOUS CONTINUOUS PRN
Status: DISCONTINUED | OUTPATIENT
Start: 2019-11-13 | End: 2019-11-13

## 2019-11-13 RX ORDER — ONDANSETRON 4 MG/1
4 TABLET, ORALLY DISINTEGRATING ORAL EVERY 30 MIN PRN
Status: DISCONTINUED | OUTPATIENT
Start: 2019-11-13 | End: 2019-11-13 | Stop reason: HOSPADM

## 2019-11-13 RX ORDER — FLUMAZENIL 0.1 MG/ML
0.2 INJECTION, SOLUTION INTRAVENOUS
Status: DISCONTINUED | OUTPATIENT
Start: 2019-11-13 | End: 2019-11-13 | Stop reason: HOSPADM

## 2019-11-13 RX ADMIN — LIDOCAINE HYDROCHLORIDE 40 MG: 20 INJECTION, SOLUTION INFILTRATION; PERINEURAL at 14:25

## 2019-11-13 RX ADMIN — PROPOFOL 200 MCG/KG/MIN: 10 INJECTION, EMULSION INTRAVENOUS at 14:25

## 2019-11-13 RX ADMIN — SODIUM CHLORIDE, POTASSIUM CHLORIDE, SODIUM LACTATE AND CALCIUM CHLORIDE: 600; 310; 30; 20 INJECTION, SOLUTION INTRAVENOUS at 14:12

## 2019-11-13 RX ADMIN — GLYCOPYRROLATE 0.2 MG: 0.2 INJECTION, SOLUTION INTRAMUSCULAR; INTRAVENOUS at 14:28

## 2019-11-13 RX ADMIN — PROPOFOL 50 MG: 10 INJECTION, EMULSION INTRAVENOUS at 14:25

## 2019-11-13 RX ADMIN — LIDOCAINE HYDROCHLORIDE 1 ML: 10 INJECTION, SOLUTION EPIDURAL; INFILTRATION; INTRACAUDAL; PERINEURAL at 14:04

## 2019-11-13 ASSESSMENT — LIFESTYLE VARIABLES: TOBACCO_USE: 1

## 2019-11-13 NOTE — DISCHARGE INSTRUCTIONS
[facility]    Home Care Following Endoscopy      Activity:    You have just undergone an endoscopic procedure usually performed with conscious sedation.  Do not work or operate machinery (including a car) for at least 12 hours.      I encourage you to walk and attempt to pass this air as soon as possible.    Diet:    Return to the diet you were on before your procedure but eat lightly for the first 12-24 hours.    Drink plenty of water.    Resume any regular medications unless otherwise advised by your physician.  Please begin any new medication prescribed as a result of your procedure as directed by your physician.     If you had any biopsy or polyp removed please refrain from aspirin or aspirin products for 2 days.  If on Coumadin please restart as instructed by your physician.   Pain:    You may take Tylenol as needed for pain.  Expected Recovery:    You can expect some mild abdominal fullness and/or discomfort due to the air used to inflate your intestinal tract. It is also normal to have a mild sore throat after upper endoscopy.    Call Your Physician if You Have:    After Colonoscopy:  o Worsening persisting abdominal pain which is worse with activity.  o Fevers (>101 degrees F), chills or shakes.  o Passage of continued blood with bowel movements.   Any questions or concerns about your recovery, please call [phone] or after hours [phone] Nurse Advice Line.    Follow-up Care:    You should receive a call or letter with your results within 1 week. Please call if you have not received a notification of your results.  If asked to return to clinic please make an appointment 1 week after your procedure.  Call [phone].

## 2019-11-13 NOTE — ANESTHESIA POSTPROCEDURE EVALUATION
Patient: Kristal Mehta    Procedure(s):  Colonoscopy, Polypectomy by Snare    Diagnosis:Special screening for malignant neoplasms, colon [Z12.11]  Diagnosis Additional Information: No value filed.    Anesthesia Type:  MAC    Note:  Anesthesia Post Evaluation    Patient location during evaluation: Phase 2  Patient participation: Able to fully participate in evaluation  Level of consciousness: awake  Pain management: adequate  Airway patency: patent  Cardiovascular status: acceptable and hemodynamically stable  Respiratory status: acceptable, room air and nonlabored ventilation  Hydration status: stable  PONV: none     Anesthetic complications: None    Comments: Patient was happy with the anesthesia care received and no anesthesia related complications were noted.  I will follow up with the patient again if it is needed.        Last vitals:  Vitals:    11/13/19 1409 11/13/19 1500 11/13/19 1530   BP: 117/65 113/73 120/88   Pulse:  (!) 49    Resp: 16  16   Temp: 98.1  F (36.7  C)     SpO2: 98% 100%          Electronically Signed By: THIEN Doherty CRNA  November 13, 2019  3:53 PM

## 2019-11-13 NOTE — ANESTHESIA PREPROCEDURE EVALUATION
Anesthesia Pre-Procedure Evaluation    Patient: Kristal Mehta   MRN: [id number] : 1965          Preoperative Diagnosis: Special screening for malignant neoplasms, colon [Z12.11]    Procedure(s):  COLONOSCOPY    Past Medical History:   Diagnosis Date     ASCUS of cervix with negative high risk HPV 2011    11/10/10- ASCUS neg HPV. Plan-- repeat screening pap in one year (due 2011) 16 NIL pap/neg HR HPV. Plan:        Past Surgical History:   Procedure Laterality Date     HC EXC BENIGN SKIN LESION TRUNK/ARM/LEG >4 CM  10/01/08    excision lesion left thigh     HC EXCISION BREAST LESION, OPEN >=1  06    Left breast     HC REPAIR INTERMED, WOUND TRUNK/ARM/LEG 2.6-7.5 CM       OPEN REDUCTION INTERNAL FIXATION WRIST  2011    right wrist fracture repair       Anesthesia Evaluation     . Pt has had prior anesthetic.     No history of anesthetic complications          ROS/MED HX    ENT/Pulmonary:     (+)tobacco use, Current use , . .    Neurologic:  - neg neurologic ROS     Cardiovascular:  - neg cardiovascular ROS   (+) ----. : . . . :. . No previous cardiac testing       METS/Exercise Tolerance:     Hematologic:  - neg hematologic  ROS       Musculoskeletal:  - neg musculoskeletal ROS       GI/Hepatic:  - neg GI/hepatic ROS      (-) GERD   Renal/Genitourinary:  - ROS Renal section negative       Endo:  - neg endo ROS       Psychiatric:  - neg psychiatric ROS       Infectious Disease:  - neg infectious disease ROS       Malignancy:      - no malignancy   Other:    - neg other ROS                      Physical Exam  Normal systems: cardiovascular and pulmonary    Airway   Mallampati: II  TM distance: >3 FB  Neck ROM: full    Dental     Cardiovascular   Rhythm and rate: regular and normal      Pulmonary    breath sounds clear to auscultation            Lab Results   Component Value Date    WBC 6.5 2019    HGB 16.2 (H) 2019    HCT 48.7 (H) 2019     2019      "11/10/2010    POTASSIUM 4.1 11/10/2010    CHLORIDE 106 11/10/2010    CO2 26 11/10/2010    BUN 12 11/10/2010    CR 0.83 11/10/2010    GLC 85 10/16/2019    MARIANELA 10.0 11/10/2010    ALBUMIN 4.7 11/10/2010    PROTTOTAL 8.6 11/10/2010    ALT 15 11/10/2010    AST 22 11/10/2010    ALKPHOS 72 11/10/2010    BILITOTAL 0.6 11/10/2010    TSH 1.43 11/10/2010    HCG Negative 12/06/2011       Preop Vitals  BP Readings from Last 3 Encounters:   11/13/19 117/65   10/16/19 122/64   03/22/19 118/66    Pulse Readings from Last 3 Encounters:   10/16/19 83   03/22/19 100   10/24/18 106      Resp Readings from Last 3 Encounters:   11/13/19 16   10/16/19 14   03/22/19 12    SpO2 Readings from Last 3 Encounters:   11/13/19 98%   10/16/19 100%   03/22/19 97%      Temp Readings from Last 1 Encounters:   11/13/19 98.1  F (36.7  C) (Oral)    Ht Readings from Last 1 Encounters:   10/16/19 1.66 m (5' 5.35\")      Wt Readings from Last 1 Encounters:   10/16/19 60.1 kg (132 lb 9.6 oz)    Estimated body mass index is 21.83 kg/m  as calculated from the following:    Height as of 10/16/19: 1.66 m (5' 5.35\").    Weight as of 10/16/19: 60.1 kg (132 lb 9.6 oz).       Anesthesia Plan      History & Physical Review  History and physical reviewed and following examination; no interval change.    ASA Status:  2 .    NPO Status:  > 6 hours    Plan for MAC with Intravenous and Propofol induction. Maintenance will be TIVA.  Reason for MAC:  Deep or markedly invasive procedure (G8)    Dr Forrester performed the H&P pre-op      Postoperative Care      Consents  Anesthetic plan, risks, benefits and alternatives discussed with:  Patient.  Use of blood products discussed: No .   .                 THIEN Doherty CRNA  "

## 2019-11-13 NOTE — ANESTHESIA CARE TRANSFER NOTE
Patient: Kristal Mehta    Procedure(s):  Colonoscopy, Polypectomy by Snare    Diagnosis: Special screening for malignant neoplasms, colon [Z12.11]  Diagnosis Additional Information: No value filed.    Anesthesia Type:   MAC     Note:  Airway :Face Mask  Patient transferred to:Phase II  Handoff Report: Identifed the Patient, Identified the Reponsible Provider, Reviewed the pertinent medical history, Discussed the surgical course, Reviewed Intra-OP anesthesia mangement and issues during anesthesia, Set expectations for post-procedure period and Allowed opportunity for questions and acknowledgement of understanding      Vitals: (Last set prior to Anesthesia Care Transfer)    CRNA VITALS  11/13/2019 1421 - 11/13/2019 1504      11/13/2019             Resp Rate (observed):  26    EKG:  Sinus rhythm                Electronically Signed By: THIEN Doherty CRNA  November 13, 2019  3:04 PM

## 2019-11-15 LAB — COPATH REPORT: NORMAL

## 2019-11-20 ENCOUNTER — HOSPITAL ENCOUNTER (OUTPATIENT)
Dept: BONE DENSITY | Facility: CLINIC | Age: 54
Discharge: HOME | End: 2019-11-20
Attending: FAMILY MEDICINE | Admitting: FAMILY MEDICINE
Payer: COMMERCIAL

## 2019-11-20 DIAGNOSIS — Z13.820 SCREENING FOR OSTEOPOROSIS: ICD-10-CM

## 2019-11-20 PROCEDURE — 77080 DXA BONE DENSITY AXIAL: CPT

## 2020-10-07 ENCOUNTER — TELEPHONE (OUTPATIENT)
Dept: FAMILY MEDICINE | Facility: CLINIC | Age: 55
End: 2020-10-07

## 2020-10-07 ENCOUNTER — VIRTUAL VISIT (OUTPATIENT)
Dept: FAMILY MEDICINE | Facility: CLINIC | Age: 55
End: 2020-10-07
Payer: COMMERCIAL

## 2020-10-07 DIAGNOSIS — Z00.00 ROUTINE GENERAL MEDICAL EXAMINATION AT A HEALTH CARE FACILITY: Primary | ICD-10-CM

## 2020-10-07 DIAGNOSIS — R23.4 BREAST SKIN CHANGES: ICD-10-CM

## 2020-10-07 DIAGNOSIS — R73.03 PREDIABETES: ICD-10-CM

## 2020-10-07 PROCEDURE — 99213 OFFICE O/P EST LOW 20 MIN: CPT | Mod: 95 | Performed by: FAMILY MEDICINE

## 2020-10-07 RX ORDER — NAPROXEN SODIUM 220 MG
220 TABLET ORAL 3 TIMES DAILY PRN
COMMUNITY
Start: 2020-10-07

## 2020-10-07 RX ORDER — MULTIVIT WITH MINERALS/LUTEIN
1 TABLET ORAL DAILY
COMMUNITY
Start: 2020-10-07 | End: 2022-05-10

## 2020-10-07 NOTE — TELEPHONE ENCOUNTER
Labs from last year pending. Please advise if you need to see patient and if there are any other labs you would like to order for her.  Lily Cheney, CMA

## 2020-10-07 NOTE — PROGRESS NOTES
"Kristal Mehta is a 54 year old female who is being evaluated via a billable telephone visit.      Telephone visit performed in lieu of an in-person visit due to current concerns regarding the current COVID-19 situation including social distancing and keeping at risk people safe.  Patient agreed to proceed with this visit due to these circumstances.     The patient has been notified of following:     \"This telephone visit will be conducted via a call between you and your physician/provider. We have found that certain health care needs can be provided without the need for a physical exam.  This service lets us provide the care you need with a short phone conversation.  If a prescription is necessary we can send it directly to your pharmacy.  If lab work is needed we can place an order for that and you can then stop by our lab to have the test done at a later time.    Telephone visits are billed at different rates depending on your insurance coverage. During this emergency period, for some insurers they may be billed the same as an in-person visit.  Please reach out to your insurance provider with any questions.    If during the course of the call the physician/provider feels a telephone visit is not appropriate, you will not be charged for this service.\"    Patient has given verbal consent for Telephone visit?  Yes    What phone number would you like to be contacted at? [phone]    How would you like to obtain your AVS? Mail a copy    Subjective     Kristal Mehta is a 54 year old female who presents via phone visit today for the following health issues:    HPI     Patient is follow up on labs and have redone due to patient watching what she is eating patient states lost 43 lbs in the last 4 months. Patient would like labs done to make sure diabetes has not returned. Patient would also like a diagnostics mammogram ordered.        Pre-Diabetes Follow-up      How often are you checking your blood sugar? Not at " "all    What concerns do you have today about your diabetes? None     Do you have any of these symptoms? (Select all that apply)  Weight loss intentional. and No numbness or tingling in feet.  No redness, sores or blisters on feet.  No complaints of excessive thirst.  No reports of blurry vision.      She is not checking her blood pressure, feels it is \"fine\" and she is doing well. She has really worked hard to lose weight.     She had recent breast cyst, it is gone now that she has lost some weight. It was in the left breast on the right side of the nipple.    BP Readings from Last 2 Encounters:   11/02/20 120/84   11/13/19 120/88     Hemoglobin A1C (%)   Date Value   10/21/2020 5.4   10/16/2019 5.4     LDL Cholesterol Calculated (mg/dL)   Date Value   10/21/2020 145 (H)   10/16/2019 135 (H)       Review of Systems   Constitutional, HEENT, cardiovascular, pulmonary, gi and gu systems are negative, except as otherwise noted.       Objective          Vitals:  No vitals were obtained today due to virtual visit.    healthy, alert and no distress  PSYCH: Alert and oriented times 3; coherent speech, normal   rate and volume, able to articulate logical thoughts, able   to abstract reason, no tangential thoughts, no hallucinations   or delusions  Her affect is normal and pleasant  RESP: No cough, no audible wheezing, able to talk in full sentences  Remainder of exam unable to be completed due to telephone visits    Orders Placed This Encounter   Procedures     MA Diagnostic Bilateral w/Ruperto     Lipid panel reflex to direct LDL Fasting     Glucose     Hemoglobin A1c     TSH with free T4 reflex     Basic metabolic panel  (Ca, Cl, CO2, Creat, Gluc, K, Na, BUN)            Assessment/Plan:      ICD-10-CM    1. Routine general medical examination at a health care facility  Z00.00 multivitamin (CENTRUM SILVER) tablet     naproxen sodium (ALEVE) 220 MG tablet   2. Breast skin changes  R23.4 MA Diagnostic Bilateral w/Ruperto     " CANCELED: MA Diagnostic Digital Bilateral     CANCELED: US Breast Left Complete 4 Quadrants   3. Prediabetes  R73.03 Lipid panel reflex to direct LDL Fasting     Glucose     Hemoglobin A1c     TSH with free T4 reflex     Basic metabolic panel  (Ca, Cl, CO2, Creat, Gluc, K, Na, BUN)        I placed orders for her labs that are due and will notify her with results.   We discussed ongoing weight loss through healthy diet and exercise. I congratulated her on her progress so far.   Will order diagnostic mammogram as well.   See patient instructions:    Patient Instructions   Please schedule your mammogram at your convenience.  I ordered it as a diagnostic mammogram with ultrasound.     Please set up a lab appointment in the next few weeks.  Remember to fast (nothing to eat or drink but water or black coffee) for 12 hours prior to the test.  I will notify you with results when I see them.     Please check your blood pressure outside of the clinic once or twice per month and record it.   Please update me after a few checks.  Our goal is under 130/80.       Please consider getting your flu shot at any location that gives vaccines.     Kari Moya MD         Phone call duration:  11 minutes

## 2020-10-07 NOTE — TELEPHONE ENCOUNTER
Reason for Call: Request for an order or referral: Order    Order or referral being requested: Blood work    Date needed: as soon as possible    Has the patient been seen by the PCP for this problem? NO    Additional comments: Pt states she doesn't need a physical but would like to have labs done for diabetes and cholesterol. Whatever she had done last year. Please call to advise when order is placed.    Phone number Patient can be reached at:  Home number on file [phone] (home)    Best Time:  Anytime    Can we leave a detailed message on this number?  YES    Call taken on 10/7/2020 at 10:48 AM by Carol Larsen

## 2020-10-07 NOTE — PATIENT INSTRUCTIONS
Please schedule your mammogram at your convenience.  I ordered it as a diagnostic mammogram with ultrasound.     Please set up a lab appointment in the next few weeks.  Remember to fast (nothing to eat or drink but water or black coffee) for 12 hours prior to the test.  I will notify you with results when I see them.     Please check your blood pressure outside of the clinic once or twice per month and record it.   Please update me after a few checks.  Our goal is under 130/80.       Please consider getting your flu shot at any location that gives vaccines.     Kari Moya MD

## 2020-10-21 ENCOUNTER — HOSPITAL ENCOUNTER (OUTPATIENT)
Dept: MAMMOGRAPHY | Facility: CLINIC | Age: 55
End: 2020-10-21
Attending: FAMILY MEDICINE
Payer: COMMERCIAL

## 2020-10-21 ENCOUNTER — HOSPITAL ENCOUNTER (OUTPATIENT)
Dept: ULTRASOUND IMAGING | Facility: CLINIC | Age: 55
End: 2020-10-21
Attending: FAMILY MEDICINE
Payer: COMMERCIAL

## 2020-10-21 DIAGNOSIS — R73.03 PREDIABETES: ICD-10-CM

## 2020-10-21 DIAGNOSIS — R23.4 BREAST SKIN CHANGES: ICD-10-CM

## 2020-10-21 LAB
ANION GAP SERPL CALCULATED.3IONS-SCNC: 7 MMOL/L (ref 3–14)
BUN SERPL-MCNC: 12 MG/DL (ref 7–30)
CALCIUM SERPL-MCNC: 9.9 MG/DL (ref 8.5–10.1)
CHLORIDE SERPL-SCNC: 105 MMOL/L (ref 94–109)
CHOLEST SERPL-MCNC: 224 MG/DL
CO2 SERPL-SCNC: 28 MMOL/L (ref 20–32)
CREAT SERPL-MCNC: 0.78 MG/DL (ref 0.52–1.04)
GFR SERPL CREATININE-BSD FRML MDRD: 86 ML/MIN/{1.73_M2}
GLUCOSE SERPL-MCNC: 95 MG/DL (ref 70–99)
HBA1C MFR BLD: 5.4 % (ref 0–5.6)
HDLC SERPL-MCNC: 50 MG/DL
LDLC SERPL CALC-MCNC: 145 MG/DL
NONHDLC SERPL-MCNC: 174 MG/DL
POTASSIUM SERPL-SCNC: 4.5 MMOL/L (ref 3.4–5.3)
SODIUM SERPL-SCNC: 140 MMOL/L (ref 133–144)
TRIGL SERPL-MCNC: 146 MG/DL
TSH SERPL DL<=0.005 MIU/L-ACNC: 1.36 MU/L (ref 0.4–4)

## 2020-10-21 PROCEDURE — 83036 HEMOGLOBIN GLYCOSYLATED A1C: CPT | Performed by: FAMILY MEDICINE

## 2020-10-21 PROCEDURE — 80061 LIPID PANEL: CPT | Performed by: FAMILY MEDICINE

## 2020-10-21 PROCEDURE — G0279 TOMOSYNTHESIS, MAMMO: HCPCS

## 2020-10-21 PROCEDURE — 76642 ULTRASOUND BREAST LIMITED: CPT | Mod: LT

## 2020-10-21 PROCEDURE — 36415 COLL VENOUS BLD VENIPUNCTURE: CPT | Performed by: FAMILY MEDICINE

## 2020-10-21 PROCEDURE — 80048 BASIC METABOLIC PNL TOTAL CA: CPT | Performed by: FAMILY MEDICINE

## 2020-10-21 PROCEDURE — 76641 ULTRASOUND BREAST COMPLETE: CPT | Mod: LT

## 2020-10-21 PROCEDURE — 84443 ASSAY THYROID STIM HORMONE: CPT | Performed by: FAMILY MEDICINE

## 2020-11-02 ENCOUNTER — ALLIED HEALTH/NURSE VISIT (OUTPATIENT)
Dept: FAMILY MEDICINE | Facility: CLINIC | Age: 55
End: 2020-11-02
Payer: COMMERCIAL

## 2020-11-02 VITALS — SYSTOLIC BLOOD PRESSURE: 120 MMHG | HEART RATE: 78 BPM | DIASTOLIC BLOOD PRESSURE: 84 MMHG

## 2020-11-02 DIAGNOSIS — Z01.30 BLOOD PRESSURE CHECK: Primary | ICD-10-CM

## 2020-11-02 PROCEDURE — 99207 PR NO CHARGE NURSE ONLY: CPT

## 2020-11-02 NOTE — PROGRESS NOTES
Kristal Mehta is a 54 year old patient who comes in today for a Blood Pressure check.  Initial BP:  /84   Pulse 78      78  Disposition: follow-up as previously indicated by provider and results routed to provider.     Karla Guevara MA

## 2020-11-16 ENCOUNTER — TELEPHONE (OUTPATIENT)
Dept: FAMILY MEDICINE | Facility: CLINIC | Age: 55
End: 2020-11-16

## 2020-11-16 NOTE — TELEPHONE ENCOUNTER
Reason for Call:  Request for results:    Name of test or procedure: labs    Date of test of procedure: 10/21/2020    Location of the test or procedure: [facility]    OK to leave the result message on voice mail or with a family member? YES    Phone number Patient can be reached at:  Home number on file [phone] (home)    Additional comments: any    Call taken on 11/16/2020 at 10:42 AM by Camacho Russell

## 2020-11-18 ENCOUNTER — TELEPHONE (OUTPATIENT)
Dept: FAMILY MEDICINE | Facility: CLINIC | Age: 55
End: 2020-11-18

## 2020-11-18 NOTE — TELEPHONE ENCOUNTER
----- Message from Kari Moya MD sent at 11/18/2020  6:22 AM CST -----  Notify Kristal that her thyroid level is perfect. Her A1C test for diabetes is normal, which is great news. Unfortunately, her cholesterol is still high, in fact a little worse than before. Her electrolytes, kidney function and blood sugar are all normal.   I recommend she continue working hard to get her cholesterol even lower by following a low fat diet, continuing to work on exercise and weight loss. I am not putting her on medication at this time, will continue to follow this yearly. Please send copy of all labs for patient records.   Kari Moya MD

## 2020-11-18 NOTE — TELEPHONE ENCOUNTER
Spoke with patient and informed of results below. Patient understood. Copy of results sent to patient in mail.   Karla Guevara MA

## 2020-11-18 NOTE — RESULT ENCOUNTER NOTE
Notify Kristal that her thyroid level is perfect. Her A1C test for diabetes is normal, which is great news. Unfortunately, her cholesterol is still high, in fact a little worse than before. Her electrolytes, kidney function and blood sugar are all normal.   I recommend she continue working hard to get her cholesterol even lower by following a low fat diet, continuing to work on exercise and weight loss. I am not putting her on medication at this time, will continue to follow this yearly. Please send copy of all labs for patient records.   Kari Moya MD

## 2020-11-18 NOTE — TELEPHONE ENCOUNTER
Patient informed no further questions were asked labs printed and placed in mail for patient  Yuliya Wilkes MA

## 2020-11-18 NOTE — LETTER
November 18, 2020      Kristal SOFI Janine  [address] [place] [address]        Dear ,    We are writing to inform you of your test results.    thyroid level is perfect. Her A1C test for diabetes is normal, which is great news. Unfortunately, her cholesterol is still high, in fact a little worse than before. Her electrolytes, kidney function and blood sugar are all normal.   I recommend she continue working hard to get her cholesterol even lower by following a low fat diet, continuing to work on exercise and weight loss. I am not putting her on medication at this time, will continue to follow this yearly.    Resulted Orders   Basic metabolic panel  (Ca, Cl, CO2, Creat, Gluc, K, Na, BUN)   Result Value Ref Range    Sodium 140 133 - 144 mmol/L    Potassium 4.5 3.4 - 5.3 mmol/L    Chloride 105 94 - 109 mmol/L    Carbon Dioxide 28 20 - 32 mmol/L    Anion Gap 7 3 - 14 mmol/L    Glucose 95 70 - 99 mg/dL      Comment:      Fasting specimen    Urea Nitrogen 12 7 - 30 mg/dL    Creatinine 0.78 0.52 - 1.04 mg/dL    GFR Estimate 86 >60 mL/min/[1.73_m2]      Comment:      Non  GFR Calc  Starting 12/18/2018, serum creatinine based estimated GFR (eGFR) will be   calculated using the Chronic Kidney Disease Epidemiology Collaboration   (CKD-EPI) equation.      GFR Estimate If Black >90 >60 mL/min/[1.73_m2]      Comment:       GFR Calc  Starting 12/18/2018, serum creatinine based estimated GFR (eGFR) will be   calculated using the Chronic Kidney Disease Epidemiology Collaboration   (CKD-EPI) equation.      Calcium 9.9 8.5 - 10.1 mg/dL   TSH with free T4 reflex   Result Value Ref Range    TSH 1.36 0.40 - 4.00 mU/L   Hemoglobin A1c   Result Value Ref Range    Hemoglobin A1C 5.4 0 - 5.6 %      Comment:      Normal <5.7% Prediabetes 5.7-6.4%  Diabetes 6.5% or higher - adopted from ADA   consensus guidelines.     Lipid panel reflex to direct LDL Fasting   Result Value Ref Range     Cholesterol 224 (H) <200 mg/dL      Comment:      Desirable:       <200 mg/dl    Triglycerides 146 <150 mg/dL      Comment:      Fasting specimen    HDL Cholesterol 50 >49 mg/dL    LDL Cholesterol Calculated 145 (H) <100 mg/dL      Comment:      Above desirable:  100-129 mg/dl  Borderline High:  130-159 mg/dL  High:             160-189 mg/dL  Very high:       >189 mg/dl      Non HDL Cholesterol 174 (H) <130 mg/dL      Comment:      Above Desirable:  130-159 mg/dl  Borderline high:  160-189 mg/dl  High:             190-219 mg/dl  Very high:       >219 mg/dl         If you have any questions or concerns, please call the clinic at the number listed above.       Sincerely,        Kari Moya MD

## 2021-11-04 ENCOUNTER — HOSPITAL ENCOUNTER (OUTPATIENT)
Dept: MAMMOGRAPHY | Facility: CLINIC | Age: 56
Discharge: HOME | End: 2021-11-04
Attending: FAMILY MEDICINE | Admitting: FAMILY MEDICINE
Payer: COMMERCIAL

## 2021-11-04 DIAGNOSIS — Z12.31 VISIT FOR SCREENING MAMMOGRAM: ICD-10-CM

## 2021-11-04 PROCEDURE — 77063 BREAST TOMOSYNTHESIS BI: CPT

## 2022-04-19 ENCOUNTER — TELEPHONE (OUTPATIENT)
Dept: FAMILY MEDICINE | Facility: CLINIC | Age: 57
End: 2022-04-19
Payer: COMMERCIAL

## 2022-04-19 DIAGNOSIS — N63.0 LUMP OR MASS IN BREAST: Primary | ICD-10-CM

## 2022-04-19 DIAGNOSIS — Z12.31 ENCOUNTER FOR SCREENING MAMMOGRAM FOR MALIGNANT NEOPLASM OF BREAST: ICD-10-CM

## 2022-04-19 NOTE — TELEPHONE ENCOUNTER
Patient calling and reporting a lump in left breast at 5:00.  Patient requesting a diagnostic mammogram as she states she gets cysts often.    If order placed please inform patient    Please advise    Hayley Carpio

## 2022-04-20 NOTE — TELEPHONE ENCOUNTER
Please notify her that I will put in orders but she also needs to be seen by a physician to have this breast lump examined. Needs to be a diagnostic mammo with ultrasound due to having a lump.   Kari Moya MD

## 2022-05-04 ENCOUNTER — HOSPITAL ENCOUNTER (OUTPATIENT)
Dept: ULTRASOUND IMAGING | Facility: CLINIC | Age: 57
Discharge: HOME | End: 2022-05-04
Attending: FAMILY MEDICINE
Payer: COMMERCIAL

## 2022-05-04 ENCOUNTER — HOSPITAL ENCOUNTER (OUTPATIENT)
Dept: MAMMOGRAPHY | Facility: CLINIC | Age: 57
Discharge: HOME | End: 2022-05-04
Attending: FAMILY MEDICINE
Payer: COMMERCIAL

## 2022-05-04 DIAGNOSIS — N63.0 LUMP OR MASS IN BREAST: ICD-10-CM

## 2022-05-04 PROCEDURE — 76642 ULTRASOUND BREAST LIMITED: CPT | Mod: LT

## 2022-05-04 PROCEDURE — 77061 BREAST TOMOSYNTHESIS UNI: CPT | Mod: LT

## 2022-05-10 ENCOUNTER — OFFICE VISIT (OUTPATIENT)
Dept: FAMILY MEDICINE | Facility: CLINIC | Age: 57
End: 2022-05-10
Payer: COMMERCIAL

## 2022-05-10 VITALS
TEMPERATURE: 97.9 F | BODY MASS INDEX: 21.27 KG/M2 | SYSTOLIC BLOOD PRESSURE: 118 MMHG | HEART RATE: 95 BPM | WEIGHT: 129.2 LBS | DIASTOLIC BLOOD PRESSURE: 82 MMHG | RESPIRATION RATE: 16 BRPM | OXYGEN SATURATION: 98 %

## 2022-05-10 DIAGNOSIS — Z00.00 NORMAL BREAST EXAM: ICD-10-CM

## 2022-05-10 DIAGNOSIS — N63.0 LUMP OR MASS IN BREAST: Primary | ICD-10-CM

## 2022-05-10 PROCEDURE — 99213 OFFICE O/P EST LOW 20 MIN: CPT | Performed by: FAMILY MEDICINE

## 2022-05-10 ASSESSMENT — PAIN SCALES - GENERAL: PAINLEVEL: NO PAIN (0)

## 2022-05-10 NOTE — PATIENT INSTRUCTIONS
Kristal, I am very reassured to see a normal exam today and recent normal mammogram and ultrasound.  Please continue doing your breast exams once a month and please let me know if there is any new changes.    Also you are overdue for a physical and follow-up on your medications, diabetes, cholesterol, etc.  Please schedule a routine preventive visit in the next 3 to 6 months.

## 2022-05-10 NOTE — PROGRESS NOTES
Assessment & Plan       ICD-10-CM    1. Lump or mass in breast  N63.0    2. Normal breast exam  Z00.00       I am very reassured by normal exam as well as normal recent diagnostic mammogram and ultrasound.  I explained to her that I think this is just fatty tissue which is normal in the breast.  I applauded her for doing her breast exams and recommend she continue once monthly.  Notify me with changes or new concerns.    15 minutes spent on the date of the encounter doing chart review, history and exam, documentation and further activities per the note     Tobacco Cessation:   reports that she has been smoking. She has a 9.00 pack-year smoking history. She has never used smokeless tobacco.    Return in about 3 months (around 8/10/2022) for Routine preventive.    Kari Moya MD  [facility]    Lissa Giraldo is a 56 year old who presents for the following health issues     History of Present Illness       Reason for visit:  You wanted to see me  Symptom onset:  More than a month  Symptom progression:  Staying the same  Had these symptoms before:  No  What makes it worse:  No  What makes it better:  No    She eats 0-1 servings of fruits and vegetables daily.She consumes 0 sweetened beverage(s) daily.   She is taking medications regularly.     Kristal felt a lump in her left breast about 1 month ago.  She thinks it comes and goes.  She does her exams a couple times a week and she went in recently and had a diagnostic mammogram and ultrasound on the left breast and it did not show anything abnormal.  She cannot feel the lump today.  It has not been painful.  Her previous most recent routine screening mammogram was just done in November and was normal also.    Review of Systems   Constitutional, HEENT, cardiovascular, pulmonary, gi and gu systems are negative, except as otherwise noted.      Objective    /82   Pulse 95   Temp 97.9  F (36.6  C) (Temporal)   Resp 16   Wt  58.6 kg (129 lb 3.2 oz)   SpO2 98%   BMI 21.27 kg/m    Body mass index is 21.27 kg/m .  Physical Exam   Vitals noted.  Patient alert, oriented, and in no acute distress.   CV:  RRR without murmur. Respiratory:  Lungs clear to auscultation bilaterally.   Breasts: Visual inspection of the breasts is normal without skin changes.  Palpation reveals no obvious masses or nipple discharge.  No axillary masses.  There is some fatty tissue in the area of concern on the medial left breast between approximately 7 and 9:00.  Feels very normal.    MA DIAGNOSTIC LEFT W/ KEYA, US BREAST LEFT LIMITED 1-3 QUADRANTS     Clinical History: Palpable lump left breast reported by patient.     Comparison:  Multiple, most recent 11/4/2021     Breast Density: Scattered areas of fibroglandular density.     Left Digital Diagnostic Mammogram     Findings: A diagnostic left mammogram was performed utilizing  tomosynthesis. There is no evidence for spiculated masses,  architectural distortion, asymmetry or suspicious calcifications.     When performed, computer-aided detection was used in the  interpretation of this study.        Left Breast Ultrasound     Findings: Ultrasound evaluation of the left breast was performed. The  breast tissue appears unremarkable without evidence for mass, abnormal  shadowing or parenchymal distortion.                                                                      Impression: No evidence of malignancy.       Recommendation:  1. Resume routine screening mammography.    2. If the palpable lump remains present in at the time of annual  screening mammography in November 2022, ultrasound could be provided  for additional evaluation.      The patient was provided with the results and recommendations at the  completion of the examination.      ACR 1: Negative      The patient will receive a lay language report of this examination.     RODRIGUEZ ROCK MD

## 2022-11-08 ENCOUNTER — HOSPITAL ENCOUNTER (OUTPATIENT)
Dept: MAMMOGRAPHY | Facility: CLINIC | Age: 57
Discharge: HOME | End: 2022-11-08
Attending: FAMILY MEDICINE | Admitting: FAMILY MEDICINE
Payer: COMMERCIAL

## 2022-11-08 DIAGNOSIS — Z12.31 VISIT FOR SCREENING MAMMOGRAM: ICD-10-CM

## 2022-11-08 PROCEDURE — 77067 SCR MAMMO BI INCL CAD: CPT

## 2023-11-21 ENCOUNTER — HOSPITAL ENCOUNTER (OUTPATIENT)
Dept: MAMMOGRAPHY | Facility: CLINIC | Age: 58
Discharge: HOME | End: 2023-11-21
Attending: FAMILY MEDICINE | Admitting: FAMILY MEDICINE
Payer: COMMERCIAL

## 2023-11-21 DIAGNOSIS — Z12.31 VISIT FOR SCREENING MAMMOGRAM: ICD-10-CM

## 2023-11-21 PROCEDURE — 77067 SCR MAMMO BI INCL CAD: CPT

## 2024-12-03 ENCOUNTER — HOSPITAL ENCOUNTER (OUTPATIENT)
Dept: MAMMOGRAPHY | Facility: CLINIC | Age: 59
Discharge: HOME | End: 2024-12-03
Attending: FAMILY MEDICINE
Payer: COMMERCIAL

## 2024-12-03 DIAGNOSIS — Z12.31 SCREENING MAMMOGRAM FOR BREAST CANCER: ICD-10-CM

## 2024-12-03 PROCEDURE — 77063 BREAST TOMOSYNTHESIS BI: CPT
